# Patient Record
Sex: FEMALE | Race: WHITE | NOT HISPANIC OR LATINO | URBAN - METROPOLITAN AREA
[De-identification: names, ages, dates, MRNs, and addresses within clinical notes are randomized per-mention and may not be internally consistent; named-entity substitution may affect disease eponyms.]

---

## 2024-08-25 ENCOUNTER — APPOINTMENT (OUTPATIENT)
Dept: RADIOLOGY | Facility: MEDICAL CENTER | Age: 64
End: 2024-08-25
Attending: EMERGENCY MEDICINE

## 2024-08-25 ENCOUNTER — HOSPITAL ENCOUNTER (INPATIENT)
Facility: MEDICAL CENTER | Age: 64
LOS: 2 days | End: 2024-08-28
Attending: EMERGENCY MEDICINE | Admitting: SURGERY

## 2024-08-25 DIAGNOSIS — K85.10 GALLSTONE PANCREATITIS: ICD-10-CM

## 2024-08-25 LAB
ALBUMIN SERPL BCP-MCNC: 4.9 G/DL (ref 3.2–4.9)
ALBUMIN/GLOB SERPL: 1.5 G/DL
ALP SERPL-CCNC: 332 U/L (ref 30–99)
ALT SERPL-CCNC: 416 U/L (ref 2–50)
ANION GAP SERPL CALC-SCNC: 23 MMOL/L (ref 7–16)
AST SERPL-CCNC: 140 U/L (ref 12–45)
BASOPHILS # BLD AUTO: 0.2 % (ref 0–1.8)
BASOPHILS # BLD: 0.04 K/UL (ref 0–0.12)
BILIRUB SERPL-MCNC: 5.5 MG/DL (ref 0.1–1.5)
BUN SERPL-MCNC: 11 MG/DL (ref 8–22)
CALCIUM ALBUM COR SERPL-MCNC: 9.8 MG/DL (ref 8.5–10.5)
CALCIUM SERPL-MCNC: 10.5 MG/DL (ref 8.5–10.5)
CHLORIDE SERPL-SCNC: 96 MMOL/L (ref 96–112)
CO2 SERPL-SCNC: 20 MMOL/L (ref 20–33)
CREAT SERPL-MCNC: 0.68 MG/DL (ref 0.5–1.4)
EKG IMPRESSION: NORMAL
EOSINOPHIL # BLD AUTO: 0 K/UL (ref 0–0.51)
EOSINOPHIL NFR BLD: 0 % (ref 0–6.9)
ERYTHROCYTE [DISTWIDTH] IN BLOOD BY AUTOMATED COUNT: 41.7 FL (ref 35.9–50)
GFR SERPLBLD CREATININE-BSD FMLA CKD-EPI: 98 ML/MIN/1.73 M 2
GLOBULIN SER CALC-MCNC: 3.3 G/DL (ref 1.9–3.5)
GLUCOSE SERPL-MCNC: 179 MG/DL (ref 65–99)
HCT VFR BLD AUTO: 47.1 % (ref 37–47)
HGB BLD-MCNC: 15.9 G/DL (ref 12–16)
IMM GRANULOCYTES # BLD AUTO: 0.09 K/UL (ref 0–0.11)
IMM GRANULOCYTES NFR BLD AUTO: 0.4 % (ref 0–0.9)
LIPASE SERPL-CCNC: >3000 U/L (ref 11–82)
LYMPHOCYTES # BLD AUTO: 2.88 K/UL (ref 1–4.8)
LYMPHOCYTES NFR BLD: 13 % (ref 22–41)
MCH RBC QN AUTO: 29.5 PG (ref 27–33)
MCHC RBC AUTO-ENTMCNC: 33.8 G/DL (ref 32.2–35.5)
MCV RBC AUTO: 87.4 FL (ref 81.4–97.8)
MONOCYTES # BLD AUTO: 1.18 K/UL (ref 0–0.85)
MONOCYTES NFR BLD AUTO: 5.3 % (ref 0–13.4)
NEUTROPHILS # BLD AUTO: 17.94 K/UL (ref 1.82–7.42)
NEUTROPHILS NFR BLD: 81.1 % (ref 44–72)
NRBC # BLD AUTO: 0 K/UL
NRBC BLD-RTO: 0 /100 WBC (ref 0–0.2)
PLATELET # BLD AUTO: 402 K/UL (ref 164–446)
PMV BLD AUTO: 9.5 FL (ref 9–12.9)
POTASSIUM SERPL-SCNC: 3.3 MMOL/L (ref 3.6–5.5)
PROT SERPL-MCNC: 8.2 G/DL (ref 6–8.2)
RBC # BLD AUTO: 5.39 M/UL (ref 4.2–5.4)
SODIUM SERPL-SCNC: 139 MMOL/L (ref 135–145)
TROPONIN T SERPL-MCNC: 7 NG/L (ref 6–19)
WBC # BLD AUTO: 22.1 K/UL (ref 4.8–10.8)

## 2024-08-25 PROCEDURE — 99285 EMERGENCY DEPT VISIT HI MDM: CPT

## 2024-08-25 PROCEDURE — 83690 ASSAY OF LIPASE: CPT

## 2024-08-25 PROCEDURE — 36415 COLL VENOUS BLD VENIPUNCTURE: CPT

## 2024-08-25 PROCEDURE — 700105 HCHG RX REV CODE 258: Performed by: EMERGENCY MEDICINE

## 2024-08-25 PROCEDURE — 84100 ASSAY OF PHOSPHORUS: CPT

## 2024-08-25 PROCEDURE — 83735 ASSAY OF MAGNESIUM: CPT

## 2024-08-25 PROCEDURE — 96375 TX/PRO/DX INJ NEW DRUG ADDON: CPT

## 2024-08-25 PROCEDURE — 80053 COMPREHEN METABOLIC PANEL: CPT

## 2024-08-25 PROCEDURE — 84484 ASSAY OF TROPONIN QUANT: CPT

## 2024-08-25 PROCEDURE — 85025 COMPLETE CBC W/AUTO DIFF WBC: CPT

## 2024-08-25 PROCEDURE — 93005 ELECTROCARDIOGRAM TRACING: CPT

## 2024-08-25 PROCEDURE — 76705 ECHO EXAM OF ABDOMEN: CPT

## 2024-08-25 PROCEDURE — 93005 ELECTROCARDIOGRAM TRACING: CPT | Performed by: EMERGENCY MEDICINE

## 2024-08-25 PROCEDURE — 700111 HCHG RX REV CODE 636 W/ 250 OVERRIDE (IP): Mod: JZ | Performed by: EMERGENCY MEDICINE

## 2024-08-25 RX ORDER — DIPHENHYDRAMINE HYDROCHLORIDE 50 MG/ML
25 INJECTION INTRAMUSCULAR; INTRAVENOUS ONCE
Status: COMPLETED | OUTPATIENT
Start: 2024-08-25 | End: 2024-08-25

## 2024-08-25 RX ORDER — ASPIRIN 81 MG/1
81 TABLET ORAL
COMMUNITY

## 2024-08-25 RX ORDER — MORPHINE SULFATE 4 MG/ML
4 INJECTION INTRAVENOUS ONCE
Status: DISCONTINUED | OUTPATIENT
Start: 2024-08-25 | End: 2024-08-26

## 2024-08-25 RX ORDER — ATORVASTATIN CALCIUM 10 MG/1
10 TABLET, FILM COATED ORAL NIGHTLY
COMMUNITY

## 2024-08-25 RX ORDER — SODIUM CHLORIDE, SODIUM LACTATE, POTASSIUM CHLORIDE, CALCIUM CHLORIDE 600; 310; 30; 20 MG/100ML; MG/100ML; MG/100ML; MG/100ML
1000 INJECTION, SOLUTION INTRAVENOUS ONCE
Status: COMPLETED | OUTPATIENT
Start: 2024-08-25 | End: 2024-08-26

## 2024-08-25 RX ADMIN — DIPHENHYDRAMINE HYDROCHLORIDE 25 MG: 50 INJECTION, SOLUTION INTRAMUSCULAR; INTRAVENOUS at 22:55

## 2024-08-25 RX ADMIN — SODIUM CHLORIDE, POTASSIUM CHLORIDE, SODIUM LACTATE AND CALCIUM CHLORIDE 1000 ML: 600; 310; 30; 20 INJECTION, SOLUTION INTRAVENOUS at 22:54

## 2024-08-26 ENCOUNTER — APPOINTMENT (OUTPATIENT)
Dept: RADIOLOGY | Facility: MEDICAL CENTER | Age: 64
End: 2024-08-26
Attending: INTERNAL MEDICINE

## 2024-08-26 PROBLEM — K85.10 ACUTE GALLSTONE PANCREATITIS: Status: ACTIVE | Noted: 2024-08-26

## 2024-08-26 LAB
APPEARANCE UR: CLEAR
BACTERIA #/AREA URNS HPF: NEGATIVE /HPF
BILIRUB UR QL STRIP.AUTO: ABNORMAL
COLOR UR: ABNORMAL
EPI CELLS #/AREA URNS HPF: NEGATIVE /HPF
GLUCOSE BLD STRIP.AUTO-MCNC: 116 MG/DL (ref 65–99)
GLUCOSE BLD STRIP.AUTO-MCNC: 80 MG/DL (ref 65–99)
GLUCOSE BLD STRIP.AUTO-MCNC: 88 MG/DL (ref 65–99)
GLUCOSE BLD STRIP.AUTO-MCNC: 94 MG/DL (ref 65–99)
GLUCOSE UR STRIP.AUTO-MCNC: NEGATIVE MG/DL
HYALINE CASTS #/AREA URNS LPF: ABNORMAL /LPF
KETONES UR STRIP.AUTO-MCNC: 80 MG/DL
LEUKOCYTE ESTERASE UR QL STRIP.AUTO: NEGATIVE
MAGNESIUM SERPL-MCNC: 2 MG/DL (ref 1.5–2.5)
MICRO URNS: ABNORMAL
NITRITE UR QL STRIP.AUTO: NEGATIVE
PH UR STRIP.AUTO: 5.5 [PH] (ref 5–8)
PHOSPHATE SERPL-MCNC: 1.8 MG/DL (ref 2.5–4.5)
PROT UR QL STRIP: 30 MG/DL
RBC # URNS HPF: ABNORMAL /HPF
RBC UR QL AUTO: ABNORMAL
SP GR UR STRIP.AUTO: 1.01
UROBILINOGEN UR STRIP.AUTO-MCNC: 1 MG/DL
WBC #/AREA URNS HPF: ABNORMAL /HPF

## 2024-08-26 PROCEDURE — 700105 HCHG RX REV CODE 258: Performed by: EMERGENCY MEDICINE

## 2024-08-26 PROCEDURE — 99232 SBSQ HOSP IP/OBS MODERATE 35: CPT | Mod: DUPCHRG | Performed by: SURGERY

## 2024-08-26 PROCEDURE — 82962 GLUCOSE BLOOD TEST: CPT | Mod: 91

## 2024-08-26 PROCEDURE — 96366 THER/PROPH/DIAG IV INF ADDON: CPT

## 2024-08-26 PROCEDURE — 770001 HCHG ROOM/CARE - MED/SURG/GYN PRIV*

## 2024-08-26 PROCEDURE — A9270 NON-COVERED ITEM OR SERVICE: HCPCS | Performed by: SURGERY

## 2024-08-26 PROCEDURE — 700102 HCHG RX REV CODE 250 W/ 637 OVERRIDE(OP): Performed by: SURGERY

## 2024-08-26 PROCEDURE — 99222 1ST HOSP IP/OBS MODERATE 55: CPT | Performed by: INTERNAL MEDICINE

## 2024-08-26 PROCEDURE — 81001 URINALYSIS AUTO W/SCOPE: CPT

## 2024-08-26 PROCEDURE — 96375 TX/PRO/DX INJ NEW DRUG ADDON: CPT

## 2024-08-26 PROCEDURE — 700105 HCHG RX REV CODE 258: Performed by: SURGERY

## 2024-08-26 PROCEDURE — 96365 THER/PROPH/DIAG IV INF INIT: CPT

## 2024-08-26 PROCEDURE — 700111 HCHG RX REV CODE 636 W/ 250 OVERRIDE (IP): Mod: JZ | Performed by: EMERGENCY MEDICINE

## 2024-08-26 PROCEDURE — 96376 TX/PRO/DX INJ SAME DRUG ADON: CPT

## 2024-08-26 PROCEDURE — 700111 HCHG RX REV CODE 636 W/ 250 OVERRIDE (IP): Mod: JZ | Performed by: SURGERY

## 2024-08-26 PROCEDURE — 99222 1ST HOSP IP/OBS MODERATE 55: CPT | Performed by: SURGERY

## 2024-08-26 RX ORDER — AMOXICILLIN 250 MG
1 CAPSULE ORAL NIGHTLY
Status: DISCONTINUED | OUTPATIENT
Start: 2024-08-26 | End: 2024-08-28 | Stop reason: HOSPADM

## 2024-08-26 RX ORDER — ATORVASTATIN CALCIUM 10 MG/1
10 TABLET, FILM COATED ORAL NIGHTLY
Status: DISCONTINUED | OUTPATIENT
Start: 2024-08-26 | End: 2024-08-28 | Stop reason: HOSPADM

## 2024-08-26 RX ORDER — CELECOXIB 200 MG/1
200 CAPSULE ORAL 2 TIMES DAILY
Status: DISCONTINUED | OUTPATIENT
Start: 2024-08-26 | End: 2024-08-28 | Stop reason: HOSPADM

## 2024-08-26 RX ORDER — CELECOXIB 200 MG/1
200 CAPSULE ORAL 2 TIMES DAILY PRN
Status: DISCONTINUED | OUTPATIENT
Start: 2024-08-31 | End: 2024-08-28 | Stop reason: HOSPADM

## 2024-08-26 RX ORDER — OXYCODONE HYDROCHLORIDE 10 MG/1
10 TABLET ORAL
Status: DISCONTINUED | OUTPATIENT
Start: 2024-08-26 | End: 2024-08-28 | Stop reason: HOSPADM

## 2024-08-26 RX ORDER — DIPHENHYDRAMINE HYDROCHLORIDE 50 MG/ML
25 INJECTION INTRAMUSCULAR; INTRAVENOUS ONCE
Status: COMPLETED | OUTPATIENT
Start: 2024-08-26 | End: 2024-08-26

## 2024-08-26 RX ORDER — AMOXICILLIN 250 MG
1 CAPSULE ORAL
Status: DISCONTINUED | OUTPATIENT
Start: 2024-08-26 | End: 2024-08-28 | Stop reason: HOSPADM

## 2024-08-26 RX ORDER — ONDANSETRON 2 MG/ML
4 INJECTION INTRAMUSCULAR; INTRAVENOUS EVERY 4 HOURS PRN
Status: DISCONTINUED | OUTPATIENT
Start: 2024-08-26 | End: 2024-08-28 | Stop reason: HOSPADM

## 2024-08-26 RX ORDER — POLYETHYLENE GLYCOL 3350 17 G/17G
1 POWDER, FOR SOLUTION ORAL 2 TIMES DAILY
Status: DISCONTINUED | OUTPATIENT
Start: 2024-08-26 | End: 2024-08-28 | Stop reason: HOSPADM

## 2024-08-26 RX ORDER — OXYCODONE HYDROCHLORIDE 5 MG/1
5 TABLET ORAL
Status: DISCONTINUED | OUTPATIENT
Start: 2024-08-26 | End: 2024-08-28 | Stop reason: HOSPADM

## 2024-08-26 RX ORDER — ONDANSETRON 4 MG/1
4 TABLET, ORALLY DISINTEGRATING ORAL EVERY 4 HOURS PRN
Status: DISCONTINUED | OUTPATIENT
Start: 2024-08-26 | End: 2024-08-28 | Stop reason: HOSPADM

## 2024-08-26 RX ORDER — DOCUSATE SODIUM 100 MG/1
100 CAPSULE, LIQUID FILLED ORAL 2 TIMES DAILY
Status: DISCONTINUED | OUTPATIENT
Start: 2024-08-26 | End: 2024-08-28 | Stop reason: HOSPADM

## 2024-08-26 RX ORDER — SODIUM CHLORIDE, SODIUM LACTATE, POTASSIUM CHLORIDE, CALCIUM CHLORIDE 600; 310; 30; 20 MG/100ML; MG/100ML; MG/100ML; MG/100ML
INJECTION, SOLUTION INTRAVENOUS CONTINUOUS
Status: DISCONTINUED | OUTPATIENT
Start: 2024-08-26 | End: 2024-08-27

## 2024-08-26 RX ORDER — MORPHINE SULFATE 4 MG/ML
2 INJECTION INTRAVENOUS ONCE
Status: COMPLETED | OUTPATIENT
Start: 2024-08-26 | End: 2024-08-26

## 2024-08-26 RX ORDER — SODIUM PHOSPHATE,MONO-DIBASIC 19G-7G/118
1 ENEMA (ML) RECTAL
Status: DISCONTINUED | OUTPATIENT
Start: 2024-08-26 | End: 2024-08-28 | Stop reason: HOSPADM

## 2024-08-26 RX ORDER — ACETAMINOPHEN 500 MG
1000 TABLET ORAL EVERY 6 HOURS
Status: DISCONTINUED | OUTPATIENT
Start: 2024-08-26 | End: 2024-08-27

## 2024-08-26 RX ORDER — HYDROMORPHONE HYDROCHLORIDE 1 MG/ML
0.5 INJECTION, SOLUTION INTRAMUSCULAR; INTRAVENOUS; SUBCUTANEOUS
Status: DISCONTINUED | OUTPATIENT
Start: 2024-08-26 | End: 2024-08-28 | Stop reason: HOSPADM

## 2024-08-26 RX ORDER — ACETAMINOPHEN 500 MG
1000 TABLET ORAL EVERY 6 HOURS PRN
Status: DISCONTINUED | OUTPATIENT
Start: 2024-08-31 | End: 2024-08-27

## 2024-08-26 RX ORDER — BISACODYL 10 MG
10 SUPPOSITORY, RECTAL RECTAL
Status: DISCONTINUED | OUTPATIENT
Start: 2024-08-26 | End: 2024-08-28 | Stop reason: HOSPADM

## 2024-08-26 RX ORDER — FAMOTIDINE 20 MG/1
20 TABLET, FILM COATED ORAL 2 TIMES DAILY
Status: DISCONTINUED | OUTPATIENT
Start: 2024-08-26 | End: 2024-08-28 | Stop reason: HOSPADM

## 2024-08-26 RX ADMIN — MORPHINE SULFATE 2 MG: 4 INJECTION, SOLUTION INTRAMUSCULAR; INTRAVENOUS at 00:12

## 2024-08-26 RX ADMIN — PIPERACILLIN AND TAZOBACTAM 4.5 G: 4; .5 INJECTION, POWDER, FOR SOLUTION INTRAVENOUS at 15:24

## 2024-08-26 RX ADMIN — CELECOXIB 200 MG: 200 CAPSULE ORAL at 06:17

## 2024-08-26 RX ADMIN — CELECOXIB 200 MG: 200 CAPSULE ORAL at 16:28

## 2024-08-26 RX ADMIN — FAMOTIDINE 20 MG: 20 TABLET, FILM COATED ORAL at 06:17

## 2024-08-26 RX ADMIN — ACETAMINOPHEN 1000 MG: 500 TABLET ORAL at 01:40

## 2024-08-26 RX ADMIN — MAGNESIUM HYDROXIDE 30 ML: 1200 LIQUID ORAL at 01:42

## 2024-08-26 RX ADMIN — SODIUM CHLORIDE, POTASSIUM CHLORIDE, SODIUM LACTATE AND CALCIUM CHLORIDE: 600; 310; 30; 20 INJECTION, SOLUTION INTRAVENOUS at 00:45

## 2024-08-26 RX ADMIN — SODIUM CHLORIDE, POTASSIUM CHLORIDE, SODIUM LACTATE AND CALCIUM CHLORIDE: 600; 310; 30; 20 INJECTION, SOLUTION INTRAVENOUS at 13:17

## 2024-08-26 RX ADMIN — ACETAMINOPHEN 1000 MG: 500 TABLET ORAL at 06:17

## 2024-08-26 RX ADMIN — POLYETHYLENE GLYCOL 3350 1 PACKET: 17 POWDER, FOR SOLUTION ORAL at 06:17

## 2024-08-26 RX ADMIN — DOCUSATE SODIUM 100 MG: 100 CAPSULE, LIQUID FILLED ORAL at 06:16

## 2024-08-26 RX ADMIN — ATORVASTATIN CALCIUM 10 MG: 10 TABLET, FILM COATED ORAL at 20:51

## 2024-08-26 RX ADMIN — PIPERACILLIN AND TAZOBACTAM 4.5 G: 4; .5 INJECTION, POWDER, FOR SOLUTION INTRAVENOUS at 03:40

## 2024-08-26 RX ADMIN — PIPERACILLIN AND TAZOBACTAM 4.5 G: 4; .5 INJECTION, POWDER, FOR SOLUTION INTRAVENOUS at 20:53

## 2024-08-26 RX ADMIN — PIPERACILLIN AND TAZOBACTAM 3.38 G: 3; .375 INJECTION, POWDER, FOR SOLUTION INTRAVENOUS at 00:16

## 2024-08-26 RX ADMIN — FAMOTIDINE 20 MG: 20 TABLET, FILM COATED ORAL at 16:25

## 2024-08-26 RX ADMIN — DIPHENHYDRAMINE HYDROCHLORIDE 25 MG: 50 INJECTION, SOLUTION INTRAMUSCULAR; INTRAVENOUS at 00:11

## 2024-08-26 SDOH — ECONOMIC STABILITY: TRANSPORTATION INSECURITY
IN THE PAST 12 MONTHS, HAS THE LACK OF TRANSPORTATION KEPT YOU FROM MEDICAL APPOINTMENTS OR FROM GETTING MEDICATIONS?: NO

## 2024-08-26 SDOH — ECONOMIC STABILITY: TRANSPORTATION INSECURITY
IN THE PAST 12 MONTHS, HAS LACK OF RELIABLE TRANSPORTATION KEPT YOU FROM MEDICAL APPOINTMENTS, MEETINGS, WORK OR FROM GETTING THINGS NEEDED FOR DAILY LIVING?: NO

## 2024-08-26 ASSESSMENT — COGNITIVE AND FUNCTIONAL STATUS - GENERAL
DAILY ACTIVITIY SCORE: 24
MOBILITY SCORE: 24
SUGGESTED CMS G CODE MODIFIER MOBILITY: CH
SUGGESTED CMS G CODE MODIFIER DAILY ACTIVITY: CH

## 2024-08-26 ASSESSMENT — SOCIAL DETERMINANTS OF HEALTH (SDOH)
WITHIN THE LAST YEAR, HAVE YOU BEEN HUMILIATED OR EMOTIONALLY ABUSED IN OTHER WAYS BY YOUR PARTNER OR EX-PARTNER?: NO
WITHIN THE LAST YEAR, HAVE TO BEEN RAPED OR FORCED TO HAVE ANY KIND OF SEXUAL ACTIVITY BY YOUR PARTNER OR EX-PARTNER?: NO
WITHIN THE PAST 12 MONTHS, YOU WORRIED THAT YOUR FOOD WOULD RUN OUT BEFORE YOU GOT THE MONEY TO BUY MORE: NEVER TRUE
WITHIN THE LAST YEAR, HAVE YOU BEEN KICKED, HIT, SLAPPED, OR OTHERWISE PHYSICALLY HURT BY YOUR PARTNER OR EX-PARTNER?: NO
WITHIN THE PAST 12 MONTHS, THE FOOD YOU BOUGHT JUST DIDN'T LAST AND YOU DIDN'T HAVE MONEY TO GET MORE: NEVER TRUE
WITHIN THE LAST YEAR, HAVE YOU BEEN AFRAID OF YOUR PARTNER OR EX-PARTNER?: NO
IN THE PAST 12 MONTHS, HAS THE ELECTRIC, GAS, OIL, OR WATER COMPANY THREATENED TO SHUT OFF SERVICE IN YOUR HOME?: NO

## 2024-08-26 ASSESSMENT — LIFESTYLE VARIABLES
DOES PATIENT WANT TO STOP DRINKING: NO
HAVE PEOPLE ANNOYED YOU BY CRITICIZING YOUR DRINKING: NO
HAVE YOU EVER FELT YOU SHOULD CUT DOWN ON YOUR DRINKING: NO
CONSUMPTION TOTAL: NEGATIVE
HOW MANY TIMES IN THE PAST YEAR HAVE YOU HAD 5 OR MORE DRINKS IN A DAY: 0
ON A TYPICAL DAY WHEN YOU DRINK ALCOHOL HOW MANY DRINKS DO YOU HAVE: 0
ALCOHOL_USE: NO
TOTAL SCORE: 0
EVER FELT BAD OR GUILTY ABOUT YOUR DRINKING: NO
AVERAGE NUMBER OF DAYS PER WEEK YOU HAVE A DRINK CONTAINING ALCOHOL: 0
TOTAL SCORE: 0
EVER HAD A DRINK FIRST THING IN THE MORNING TO STEADY YOUR NERVES TO GET RID OF A HANGOVER: NO
TOTAL SCORE: 0

## 2024-08-26 ASSESSMENT — COPD QUESTIONNAIRES
COPD SCREENING SCORE: 2
DURING THE PAST 4 WEEKS HOW MUCH DID YOU FEEL SHORT OF BREATH: NONE/LITTLE OF THE TIME
DO YOU EVER COUGH UP ANY MUCUS OR PHLEGM?: NO/ONLY WITH OCCASIONAL COLDS OR INFECTIONS
HAVE YOU SMOKED AT LEAST 100 CIGARETTES IN YOUR ENTIRE LIFE: NO/DON'T KNOW

## 2024-08-26 ASSESSMENT — PATIENT HEALTH QUESTIONNAIRE - PHQ9
SUM OF ALL RESPONSES TO PHQ9 QUESTIONS 1 AND 2: 0
2. FEELING DOWN, DEPRESSED, IRRITABLE, OR HOPELESS: NOT AT ALL
2. FEELING DOWN, DEPRESSED, IRRITABLE, OR HOPELESS: NOT AT ALL
1. LITTLE INTEREST OR PLEASURE IN DOING THINGS: NOT AT ALL
SUM OF ALL RESPONSES TO PHQ9 QUESTIONS 1 AND 2: 0
1. LITTLE INTEREST OR PLEASURE IN DOING THINGS: NOT AT ALL

## 2024-08-26 ASSESSMENT — PAIN DESCRIPTION - PAIN TYPE
TYPE: ACUTE PAIN

## 2024-08-26 ASSESSMENT — FIBROSIS 4 INDEX
FIB4 SCORE: 1.06
FIB4 SCORE: 1.06

## 2024-08-26 NOTE — PROGRESS NOTES
Received report from previous shift RN  Assessment complete.  A&O x 4. Patient calls appropriately.  Patient ambulates with one person assist.  Patient has 0/10 pain. Patient declined interventions  Denies N&V. Patient is NPO   + void, + flatus, + BM.  Patient denies SOB.  Patient sitting up in bed. Family at bedside   Review plan with of care with patient. Call light and personal belongings with in reach. Hourly rounding in place. All needs met at this time.

## 2024-08-26 NOTE — ED NOTES
Patient remains pain free.  Awaiting to go to MRI.  Patients bed in [facility] ready and clean, transport here, report called.  Patient transported to T432, all belongings up with patient.

## 2024-08-26 NOTE — H&P
"    CHIEF COMPLAINT: Right upper quadrant pain     HISTORY OF PRESENT ILLNESS: The patient is a 63 year-old [place] woman who presents to the Emergency Department a 4-day history of progressive epigastric and right subcostal abdominal pain. The pain is associated with nausea and vomiting.  In retrospect, she believes that she has had several prior episodes of less severe biliary colic.  She denies any food intolerance or temporal relationship between symptoms and eating.  She denies a family history of gallstones. The patient denies any recent or intercurrent illness. The patient denies any history of previous abdominal surgery.    PAST MEDICAL HISTORY:  has a past medical history of CVA (cerebral vascular accident) (HCC) (01/01/2012) and HLD (hyperlipidemia).    PAST SURGICAL HISTORY:  has no past surgical history on file.    ALLERGIES: Not on File    CURRENT MEDICATIONS: Atorvastatin and aspirin.    FAMILY HISTORY: family history is not on file.    SOCIAL HISTORY:  The patient is currently visiting the [facility] from [place].  She was scheduled to leave for home tomorrow.    REVIEW OF SYSTEMS: Comprehensive review of systems is negative with the exception of the aforementioned HPI, PMH, and PSH bullets in accordance with CMS guidelines.    PHYSICAL EXAMINATION:      Constitutional:     Vital Signs: BP (!) 169/81   Pulse 76   Temp 35.9 °C (96.7 °F) (Temporal)   Resp 19   Ht 1.62 m (5' 3.78\")   Wt 70.3 kg (155 lb)   SpO2 95%    General Appearance: is in no apparent distress, uncomfortable appearing.  HEENT: The pupils are equal, round, and reactive to light bilaterally. The extraocular muscles are intact bilaterally. The sclera are icteric. Nares and oropharynx are clear.   Neck: Supple. No adenopathy.  Respiratory:   Inspection: Unlabored respirations, no intercostal retractions, paradoxical motion, or accessory muscle use.   Auscultation: clear to auscultation.  Cardiovascular:   Inspection: The skin " is warm, dry, and well purfused.  Auscultation: Regular rate and rhythm.   Peripheral Pulses: Normal.   Abdomen:  Inspection: Abdominal inspection reveals  no abdominal distension.   Palpation: Palpation is remarkable for moderate tenderness in the epigastric and right subcostal region.   Extremities:   Examination of the upper and lower extremities demonstrates no cyanosis edema or clubbing.  Neurologic:   Alert & oriented to person, time and place. Normal motor function. Normal sensory function. No focal deficits noted.    LABORATORY VALUES:   Recent Labs     08/25/24 2236   WBC 22.1*   RBC 5.39   HEMOGLOBIN 15.9   HEMATOCRIT 47.1*   MCV 87.4   MCH 29.5   MCHC 33.8   RDW 41.7   PLATELETCT 402   MPV 9.5     Recent Labs     08/25/24 2236   SODIUM 139   POTASSIUM 3.3*   CHLORIDE 96   CO2 20   GLUCOSE 179*   BUN 11   CREATININE 0.68   CALCIUM 10.5     Recent Labs     08/25/24 2236   ASTSGOT 140*   ALTSGPT 416*   TBILIRUBIN 5.5*   ALKPHOSPHAT 332*   GLOBULIN 3.3     IMAGING:   US-RUQ   Final Result         1.  Cholelithiasis and gallbladder sludge with gallbladder wall thickening, sonographically compatible with cholecystitis.        ASSESSMENT AND PLAN:   The patient has Grade I (mild) acute cholecystitis, choledocholithiasis, and gallstone pancreatitis.     The patient will be admitted to the hospital, placed on bowel rest, and broad-spectrum antimicrobial therapy.  Gastroenterology evaluation for consideration of ERCP.  Short interval laparoscopic cholecystectomy on this hospital admission following clearance of biliary tract and resolution of acute pancreatitis or shortly after arrival to her home in [facility].    DISPOSITION: Admit [facility].       ____________________________________     Kin Boogie M.D.    DD: 8/26/2024  12:19 AM

## 2024-08-26 NOTE — ED NOTES
Break RN: patient medicated for pain. Antibiotic stared. Patient updated for plan of care by FRANKLYN.     Kermit ( Son ) [phone] would love an update tomorrow if possible.

## 2024-08-26 NOTE — CONSULTS
Date of Consultation:  8/26/2024    Patient: : Norma Irizarry  MRN: [id number]    Referring Physician: Dr. Erin Kellogg     GI:Audie Calix M.D.     Reason for Consultation: Gallstone pancreatitis    History of Present Illness: 63-year-old woman with history of CVA, hypertension, hyperlipidemia presents with 4-day history of upper abdominal pain which radiates into her back.  Associated with episodes of nausea and vomiting.  Prior to this she has had recurrent episodes of right upper quadrant pain suggestive of biliary colic.  No significant alcohol use.  No pancreatic issues in the past.  Upon admission she was noted to have a lipase of greater than 3000, white count of 22,000, elevated glucose, ALT /140 with a total bilirubin of 5.5.  Ultrasound showed multiple gallstones with gallbladder wall thickening compatible with cholecystitis.  Common bile duct was measured at only 4.5 mm i.e. nondilated.    Patient denies any GI bleeding, fevers, chills.      Patient has no allergy information on record.       Past Medical History:   Diagnosis Date    CVA (cerebral vascular accident) (HCC) 01/01/2012    HLD (hyperlipidemia)          No past surgical history on file.      No family history on file.      Social History     Socioeconomic History    Marital status:          ROS  Review of systems negative for 12 systems reviewed other than above-mentioned symptoms.      Physical Exam:  Vitals:    08/26/24 0442 08/26/24 0512 08/26/24 0612 08/26/24 0733   BP: (!) 156/72 (!) 140/67 (!) 141/66 130/71   Pulse: 81 79 93 74   Resp:   16 20   Temp:       TempSrc:       SpO2: 94% 91% 90% 96%   Weight:       Height:           Physical Exam  General appearance: No apparent distress.  HEENT: Eyes mild icterus, mucosa dry.  CVS: S1-S2 normal, RRR.  RS: Good air entry bilaterally.  Abdomen: Soft, epigastric and upper abdominal tenderness, distention.  Mild guarding.  CNS: A&O x3, nonfocal  exam.  Extremities: No edema.  Rectal: Deferred.      Labs:  Recent Labs     08/25/24 2236   WBC 22.1*   RBC 5.39   HEMOGLOBIN 15.9   HEMATOCRIT 47.1*   MCV 87.4   MCH 29.5   MCHC 33.8   RDW 41.7   PLATELETCT 402   MPV 9.5     Recent Labs     08/25/24 2236   SODIUM 139   POTASSIUM 3.3*   CHLORIDE 96   CO2 20   GLUCOSE 179*   BUN 11           Recent Labs     08/25/24 2236   ASTSGOT 140*   ALTSGPT 416*   TBILIRUBIN 5.5*   ALKPHOSPHAT 332*   GLOBULIN 3.3         Imaging:  US-RUQ  Narrative:   8/25/2024 10:48 PM    HISTORY/REASON FOR EXAM:  Abdominal pain    TECHNIQUE/EXAM DESCRIPTION AND NUMBER OF VIEWS:  Real-time sonography of the liver and biliary tree.    COMPARISON: None    FINDINGS:    The liver is normal in contour. There is no evidence of solid mass lesion. The liver measures 12.68 cm.    Gallbladder sludge is seen. Shadowing gallstones are seen.  The gallbladder wall thickness measures 3.50 mm. There is no pericholecystic fluid.    The common duct measures 4.50 mm.    The visualized pancreas is unremarkable.    The visualized aorta is normal in caliber.    Intrahepatic IVC is patent.    The portal vein is patent with hepatopetal flow. The MPV measures 1.15 cm cm.    The right kidney measures 8.65 cm, suboptimally visualized due to shadowing bowel gas.    There is no ascites.  Impression: 1.  Cholelithiasis and gallbladder sludge with gallbladder wall thickening, sonographically compatible with cholecystitis.            Impressions:  1.  Gallstone pancreatitis.  2.  Acute cholecystitis.  3.  Elevated LFTs.      Recommendations:  1.  Aggressive fluid hydration.  Patient appears to be dehydrated and hemoconcentrated.  Would recommend lactated Ringer's at 200 cc an hour for 24 hours.  2.  Trend LFTs.  LFTs could be elevated because of choledocholithiasis however a nondilated bile duct suggests against this diagnosis.  Other possibilities could include pancreatic edema causing compression of the distal common  bile duct versus the acute cholecystitis itself.  I would recommend an MRCP stat to assess the bile ducts.  If there is choledocholithiasis we can proceed with ERCP in the next day or 2.  3.  Patient has been seen by general surgery and will likely get cholecystectomy this hospitalization after bile duct clearance.  4.  Trend BUN, hematocrit to assess for adequate hemodilution and fluid resuscitation status.      This note was generated using voice recognition software which has a small chance of producing errors of grammar and possibly content. I have made every reasonable attempt to find and correct any obvious errors, but expect that some may not be found prior to finalization of this note.

## 2024-08-26 NOTE — ED TRIAGE NOTES
"Chief Complaint   Patient presents with    Abdominal Pain     Pt endorses pressure midline abdominal pressure x4 days, increasing today.  Vomiting x2 hours. -fever - diarrhea.   + nasuea         Pt is GCS 15, speaking in full sentences, follows commands and responds appropriately to questions. Resp are even and unlabored.     BP (!) 88/32   Pulse (!) 58   Temp 35.9 °C (96.7 °F) (Temporal)   Resp 20   Ht 1.62 m (5' 3.78\")   Wt 70.3 kg (155 lb)   SpO2 93%   BMI 26.79 kg/m²         "

## 2024-08-26 NOTE — ED NOTES
Med Rec complete per patient   Allergies reviewed-yes  Antibiotics in the past 30 days:no  Anticoagulant in past 14 days:no  Pharmacy patient utilizes:[facility]

## 2024-08-26 NOTE — ED NOTES
Patient denies pain at this time.  Awaiting to go to MRI.  Floor bed ordered for patient for her comfort.

## 2024-08-26 NOTE — PROGRESS NOTES
"  DATE: 8/26/2024    Hospital Day2  gallstone pancreatitis .    INTERVAL EVENTS:  Feeling much better this morning. No N/V. Pain much improved. .    REVIEW OF SYSTEMS:  Review of systems is remarkable for the following mild abdominal pain. The remainder of the comprehensive ROS is negative with the exception of the aforementioned HPI, PMH, and PSH bullets in accordance with CMS guideline.    PHYSICAL EXAMINATION:  Vital Signs: BP (!) 141/69   Pulse 78   Temp 36.9 °C (98.4 °F) (Temporal)   Resp 20   Ht 1.62 m (5' 3.78\")   Wt 70.3 kg (155 lb)   SpO2 95%   Physical Exam  Constitutional:       Appearance: Normal appearance.   HENT:      Head: Normocephalic and atraumatic.      Nose: Nose normal.      Mouth/Throat:      Mouth: Mucous membranes are moist.   Eyes:      Extraocular Movements: Extraocular movements intact.   Cardiovascular:      Rate and Rhythm: Normal rate and regular rhythm.   Pulmonary:      Effort: Pulmonary effort is normal.   Skin:     General: Skin is warm and dry.      Capillary Refill: Capillary refill takes less than 2 seconds.      Coloration: Skin is not jaundiced.   Neurological:      General: No focal deficit present.      Mental Status: She is alert.   Psychiatric:         Mood and Affect: Mood normal.         Behavior: Behavior normal.         LABORATORY VALUES:  Recent Labs     08/25/24 2236   WBC 22.1*   RBC 5.39   HEMOGLOBIN 15.9   HEMATOCRIT 47.1*   MCV 87.4   MCH 29.5   MCHC 33.8   RDW 41.7   PLATELETCT 402   MPV 9.5     Recent Labs     08/25/24  2236   SODIUM 139   POTASSIUM 3.3*   CHLORIDE 96   CO2 20   GLUCOSE 179*   BUN 11   CREATININE 0.68   CALCIUM 10.5     Recent Labs     08/25/24  2236   ASTSGOT 140*   ALTSGPT 416*   TBILIRUBIN 5.5*   ALKPHOSPHAT 332*   GLOBULIN 3.3           IMAGING:  US-RUQ   Final Result         1.  Cholelithiasis and gallbladder sludge with gallbladder wall thickening, sonographically compatible with cholecystitis.      [id number]    (Results " Pending)     On my review of the images there is cholelithiasis and minimal gallbladder wall thickening. Common bile duct is normal in size.     ASSESSMENT AND PLAN:  63 year old woman with gallstone pancreatitis.   GI Consult note pending.   MRCP pending.   Patient feeling much better.   She expresses that she would like to have her cholecystectomy when she gets back home to [place].   AM CBC/CMP.   Appreciate GI consult.            ____________________________________     Leia Uribe M.D.    DD: 8/26/2024  9:56 AM

## 2024-08-26 NOTE — ED NOTES
Bedside report received, assuming care.  Patient resting on gurney, side rails up, call light in reach.  Patient reports that she is comfortable at this time.  Awaiting an admit bed assignment, patient updated. States no needs at this time.  Pillow provided.

## 2024-08-26 NOTE — ED PROVIDER NOTES
"ED Provider Note    CHIEF COMPLAINT  Chief Complaint   Patient presents with    Abdominal Pain     Pt endorses pressure midline abdominal pressure x4 days, increasing today.  Vomiting x2 hours.  + dizziness. -fever - diarrhea.   + nasuea       HPI/ROS  LIMITATION TO HISTORY   Select: : None  OUTSIDE HISTORIAN(S):  oscar Van Myke Irizarry is a 63 y.o. female who presents to the emergency department chief complaint of abdominal pain nausea vomiting.  She has had some right upper quadrant midline abdominal discomfort for the last 4 days and over the last day has had progressively worsened.  Is gotten the point she has had nonstop vomiting 2 hours prior to arrival with the pain which significantly worsened.  Some nausea she has not constipation but no diarrhea some chills no fevers.  No chest pain or shortness of breath no dyspnea on exertion.  No unilateral bilateral leg swelling.  The pain does not radiate to her back just mostly in the mid abdomen.    PAST MEDICAL HISTORY   has a past medical history of CVA (cerebral vascular accident) (HCC) (01/01/2012) and HLD (hyperlipidemia).    SURGICAL HISTORY  patient denies any surgical history    FAMILY HISTORY  No family history on file.    SOCIAL HISTORY  Social History     Tobacco Use    Smoking status: Not on file    Smokeless tobacco: Not on file   Substance and Sexual Activity    Alcohol use: Not on file    Drug use: Not on file    Sexual activity: Not on file       CURRENT MEDICATIONS  Home Medications    **Home medications have not yet been reviewed for this encounter**         ALLERGIES  Not on File    PHYSICAL EXAM  VITAL SIGNS: BP (!) 88/32   Pulse (!) 58   Temp 35.9 °C (96.7 °F) (Temporal)   Resp 20   Ht 1.62 m (5' 3.78\")   Wt 70.3 kg (155 lb)   SpO2 93%   BMI 26.79 kg/m²    Pulse OX: Pulse Oxygen level is within normal limits  Constitutional: Alert in no apparent distress.  HENT: Normocephalic, Atraumatic, dry mucous membranes  Eyes: " PERound. Conjunctiva normal, non-icteric.   Heart: Regular rate and rhythm, intact distal pulses   Lungs: Symmetrical movement, no resp distress   Abdomen: Right upper quadrant tenderness to palpation with epigastric tenderness as well localized guarding no rebound non-distended, normal bowel sounds  EXT/Back no edema  Skin: Warm, Dry, No erythema, No rash.   Neurologic: Alert and oriented, Grossly non-focal.       EKG/LABS  Labs Reviewed   CBC WITH DIFFERENTIAL - Abnormal; Notable for the following components:       Result Value    WBC 22.1 (*)     Hematocrit 47.1 (*)     Neutrophils-Polys 81.10 (*)     Lymphocytes 13.00 (*)     Neutrophils (Absolute) 17.94 (*)     Monos (Absolute) 1.18 (*)     All other components within normal limits   COMP METABOLIC PANEL - Abnormal; Notable for the following components:    Potassium 3.3 (*)     Anion Gap 23.0 (*)     Glucose 179 (*)     AST(SGOT) 140 (*)     ALT(SGPT) 416 (*)     Alkaline Phosphatase 332 (*)     Total Bilirubin 5.5 (*)     All other components within normal limits   LIPASE - Abnormal; Notable for the following components:    Lipase >3000 (*)     All other components within normal limits   URINALYSIS - Abnormal; Notable for the following components:    Ketones 80 (*)     Protein 30 (*)     Bilirubin Small (*)     Occult Blood Moderate (*)     All other components within normal limits   URINE MICROSCOPIC (W/UA) - Abnormal; Notable for the following components:    RBC 10-20 (*)     All other components within normal limits   TROPONIN   ESTIMATED GFR   MAGNESIUM   PHOSPHORUS   POCT GLUCOSE   POCT GLUCOSE   POCT GLUCOSE   POCT GLUCOSE         Results for orders placed or performed during the hospital encounter of 08/25/24   EKG   Result Value Ref Range    Report       [facility] Emergency Dept.    Test Date:  2024-08-25  Pt Name:    BEENA PABLO               Department: ER  MRN:        [id number]                      Room:  Gender:     Female                        Technician: [id number]  :        1960                   Requested By:ER TRIAGE PROTOCOL  Order #:    [id number]                    Reading MD: Erin Kellogg MD    Measurements  Intervals                                Axis  Rate:       60                           P:          83  IL:         141                          QRS:        32  QRSD:       93                           T:          -28  QT:         549  QTc:        549    Interpretive Statements  Sinus rhythm at a rate of 60 no ST elevations or ST depressions or QT  prolongation otherwise normal intervals normal axis no abnormal Q waves or T  wave inversions  No previous ECG available for comparison  Electronically Signed On 2024 22:34:35 PDT by Erin Kellogg MD         I have independently interpreted this EKG    RADIOLOGY/PROCEDURES   I have independently interpreted the diagnostic imaging associated with this visit and am waiting the final reading from the radiologist.   My preliminary interpretation is as follows:     Right upper quadrant ultrasound cholelithiasis with gallbladder thickening    Radiologist interpretation:  US-RUQ   Final Result         1.  Cholelithiasis and gallbladder sludge with gallbladder wall thickening, sonographically compatible with cholecystitis.          COURSE & MEDICAL DECISION MAKING    ASSESSMENT, COURSE AND PLAN  Care Narrative:     Patient is a 63-year-old female presents emerged permit with 4 days of worsening abdominal pain with epigastric and right upper quadrant pain on my examination, she has had this progressive vomiting as well.  EKG which is done shows a prolonged QTc which is why she be treated with Benadryl for her nausea vomiting as well as morphine.   states she does have a history of gallstones but is never had any issues of concern for cholelithiasis choledocholithiasis gallstone pancreatitis.  She be treated with IV fluids although she was hypotensive on arrival her  bedside blood pressure in the room is significantly improved.  An ultrasound does not show any active disease in the gallbladder will move onto CT abdomen pelvis.    DISPOSITION AND DISCUSSIONS    11:36 PM  I reassessed patient at the bedside they are doing the ultrasound as we speak there is definitely evidence of cholelithiasis her laboratory and Alysis consistent with an obstructive process likely gallstone pancreatitis and a elevated LFTs and elevated T. bili.  She will be started on IV Zosyn for concern for infectious process such as a cholecystitis.    12:23 AM  Spoke dante Boogie on-call for general surgery based on evidence of cholecystitis on ultrasound elevated blood cell count or gallstone pancreatitis.  He will take the patient to her service but is requesting a consult gastroenterology.      1:03 AM  Spoke w DR Calix with GI and he will evaluate the patient in the morning for possible ERCP  Hydration: Based on the patient's presentation of Acute Vomiting and Dehydration the patient was given IV fluids. IV Hydration was used because oral hydration was not adequate alone. Upon recheck following hydration, the patient was improved.        I have discussed management of the patient with the following physicians and EMERALD's:  Fifi Boogie    Discussion of management with other [facility] or appropriate source(s): None       FINAL DIAGNOSIS  1. Gallstone pancreatitis    2. Cholecystitis        Electronically signed by: Erin Kellogg M.D., 8/25/2024 10:38 PM

## 2024-08-27 ENCOUNTER — APPOINTMENT (OUTPATIENT)
Dept: RADIOLOGY | Facility: MEDICAL CENTER | Age: 64
End: 2024-08-27
Attending: INTERNAL MEDICINE
Payer: OTHER MISCELLANEOUS

## 2024-08-27 PROBLEM — Z78.9 NO CONTRAINDICATION TO DEEP VEIN THROMBOSIS (DVT) PROPHYLAXIS: Status: ACTIVE | Noted: 2024-08-27

## 2024-08-27 LAB
ALBUMIN SERPL BCP-MCNC: 3.6 G/DL (ref 3.2–4.9)
ALBUMIN/GLOB SERPL: 1.4 G/DL
ALP SERPL-CCNC: 227 U/L (ref 30–99)
ALT SERPL-CCNC: 217 U/L (ref 2–50)
ANION GAP SERPL CALC-SCNC: 15 MMOL/L (ref 7–16)
AST SERPL-CCNC: 66 U/L (ref 12–45)
BASOPHILS # BLD AUTO: 0.3 % (ref 0–1.8)
BASOPHILS # BLD: 0.05 K/UL (ref 0–0.12)
BILIRUB SERPL-MCNC: 1.9 MG/DL (ref 0.1–1.5)
BUN SERPL-MCNC: 13 MG/DL (ref 8–22)
CALCIUM ALBUM COR SERPL-MCNC: 8.8 MG/DL (ref 8.5–10.5)
CALCIUM SERPL-MCNC: 8.5 MG/DL (ref 8.5–10.5)
CHLORIDE SERPL-SCNC: 102 MMOL/L (ref 96–112)
CO2 SERPL-SCNC: 19 MMOL/L (ref 20–33)
CREAT SERPL-MCNC: 0.52 MG/DL (ref 0.5–1.4)
EOSINOPHIL # BLD AUTO: 0.11 K/UL (ref 0–0.51)
EOSINOPHIL NFR BLD: 0.6 % (ref 0–6.9)
ERYTHROCYTE [DISTWIDTH] IN BLOOD BY AUTOMATED COUNT: 43.5 FL (ref 35.9–50)
GFR SERPLBLD CREATININE-BSD FMLA CKD-EPI: 104 ML/MIN/1.73 M 2
GLOBULIN SER CALC-MCNC: 2.5 G/DL (ref 1.9–3.5)
GLUCOSE SERPL-MCNC: 68 MG/DL (ref 65–99)
HCT VFR BLD AUTO: 40.3 % (ref 37–47)
HGB BLD-MCNC: 13.7 G/DL (ref 12–16)
IMM GRANULOCYTES # BLD AUTO: 0.08 K/UL (ref 0–0.11)
IMM GRANULOCYTES NFR BLD AUTO: 0.4 % (ref 0–0.9)
LIPASE SERPL-CCNC: 1573 U/L (ref 11–82)
LYMPHOCYTES # BLD AUTO: 1.17 K/UL (ref 1–4.8)
LYMPHOCYTES NFR BLD: 6.5 % (ref 22–41)
MCH RBC QN AUTO: 29.9 PG (ref 27–33)
MCHC RBC AUTO-ENTMCNC: 34 G/DL (ref 32.2–35.5)
MCV RBC AUTO: 88 FL (ref 81.4–97.8)
MONOCYTES # BLD AUTO: 1.22 K/UL (ref 0–0.85)
MONOCYTES NFR BLD AUTO: 6.7 % (ref 0–13.4)
NEUTROPHILS # BLD AUTO: 15.45 K/UL (ref 1.82–7.42)
NEUTROPHILS NFR BLD: 85.5 % (ref 44–72)
NRBC # BLD AUTO: 0 K/UL
NRBC BLD-RTO: 0 /100 WBC (ref 0–0.2)
PLATELET # BLD AUTO: 247 K/UL (ref 164–446)
PMV BLD AUTO: 9.5 FL (ref 9–12.9)
POTASSIUM SERPL-SCNC: 3.1 MMOL/L (ref 3.6–5.5)
PROT SERPL-MCNC: 6.1 G/DL (ref 6–8.2)
RBC # BLD AUTO: 4.58 M/UL (ref 4.2–5.4)
SODIUM SERPL-SCNC: 136 MMOL/L (ref 135–145)
WBC # BLD AUTO: 18.1 K/UL (ref 4.8–10.8)

## 2024-08-27 PROCEDURE — A9270 NON-COVERED ITEM OR SERVICE: HCPCS | Mod: JZ | Performed by: NURSE PRACTITIONER

## 2024-08-27 PROCEDURE — 700102 HCHG RX REV CODE 250 W/ 637 OVERRIDE(OP): Performed by: SURGERY

## 2024-08-27 PROCEDURE — 99233 SBSQ HOSP IP/OBS HIGH 50: CPT | Mod: DUPCHRG | Performed by: SURGERY

## 2024-08-27 PROCEDURE — 770001 HCHG ROOM/CARE - MED/SURG/GYN PRIV*

## 2024-08-27 PROCEDURE — 99232 SBSQ HOSP IP/OBS MODERATE 35: CPT | Performed by: NURSE PRACTITIONER

## 2024-08-27 PROCEDURE — 83690 ASSAY OF LIPASE: CPT

## 2024-08-27 PROCEDURE — 700105 HCHG RX REV CODE 258: Performed by: SURGERY

## 2024-08-27 PROCEDURE — 700111 HCHG RX REV CODE 636 W/ 250 OVERRIDE (IP): Mod: JZ | Performed by: SURGERY

## 2024-08-27 PROCEDURE — 700102 HCHG RX REV CODE 250 W/ 637 OVERRIDE(OP): Mod: JZ | Performed by: NURSE PRACTITIONER

## 2024-08-27 PROCEDURE — 85025 COMPLETE CBC W/AUTO DIFF WBC: CPT

## 2024-08-27 PROCEDURE — 80053 COMPREHEN METABOLIC PANEL: CPT

## 2024-08-27 PROCEDURE — 700111 HCHG RX REV CODE 636 W/ 250 OVERRIDE (IP): Mod: JZ | Performed by: NURSE PRACTITIONER

## 2024-08-27 PROCEDURE — A9270 NON-COVERED ITEM OR SERVICE: HCPCS | Performed by: SURGERY

## 2024-08-27 PROCEDURE — 74181 MRI ABDOMEN W/O CONTRAST: CPT

## 2024-08-27 RX ORDER — POTASSIUM CHLORIDE 1500 MG/1
40 TABLET, EXTENDED RELEASE ORAL EVERY 6 HOURS
Status: COMPLETED | OUTPATIENT
Start: 2024-08-27 | End: 2024-08-27

## 2024-08-27 RX ORDER — ACETAMINOPHEN 500 MG
1000 TABLET ORAL EVERY 6 HOURS PRN
Status: DISCONTINUED | OUTPATIENT
Start: 2024-08-30 | End: 2024-08-27

## 2024-08-27 RX ORDER — ENOXAPARIN SODIUM 100 MG/ML
40 INJECTION SUBCUTANEOUS DAILY
Status: DISCONTINUED | OUTPATIENT
Start: 2024-08-27 | End: 2024-08-28 | Stop reason: HOSPADM

## 2024-08-27 RX ORDER — ACETAMINOPHEN 500 MG
1000 TABLET ORAL EVERY 6 HOURS PRN
Status: DISCONTINUED | OUTPATIENT
Start: 2024-08-27 | End: 2024-08-28 | Stop reason: HOSPADM

## 2024-08-27 RX ORDER — ACETAMINOPHEN 500 MG
1000 TABLET ORAL EVERY 6 HOURS PRN
Status: DISCONTINUED | OUTPATIENT
Start: 2024-08-27 | End: 2024-08-27

## 2024-08-27 RX ADMIN — FAMOTIDINE 20 MG: 20 TABLET, FILM COATED ORAL at 17:08

## 2024-08-27 RX ADMIN — POTASSIUM CHLORIDE 40 MEQ: 1500 TABLET, EXTENDED RELEASE ORAL at 14:00

## 2024-08-27 RX ADMIN — POTASSIUM CHLORIDE 40 MEQ: 1500 TABLET, EXTENDED RELEASE ORAL at 17:08

## 2024-08-27 RX ADMIN — ACETAMINOPHEN 1000 MG: 500 TABLET ORAL at 01:51

## 2024-08-27 RX ADMIN — PIPERACILLIN AND TAZOBACTAM 4.5 G: 4; .5 INJECTION, POWDER, FOR SOLUTION INTRAVENOUS at 11:35

## 2024-08-27 RX ADMIN — ENOXAPARIN SODIUM 40 MG: 100 INJECTION SUBCUTANEOUS at 17:09

## 2024-08-27 RX ADMIN — FAMOTIDINE 20 MG: 10 INJECTION, SOLUTION INTRAVENOUS at 06:00

## 2024-08-27 RX ADMIN — ATORVASTATIN CALCIUM 10 MG: 10 TABLET, FILM COATED ORAL at 21:25

## 2024-08-27 RX ADMIN — DOCUSATE SODIUM 100 MG: 100 CAPSULE, LIQUID FILLED ORAL at 17:08

## 2024-08-27 RX ADMIN — PIPERACILLIN AND TAZOBACTAM 4.5 G: 4; .5 INJECTION, POWDER, FOR SOLUTION INTRAVENOUS at 04:58

## 2024-08-27 RX ADMIN — ACETAMINOPHEN 1000 MG: 500 TABLET ORAL at 11:35

## 2024-08-27 RX ADMIN — CELECOXIB 200 MG: 200 CAPSULE ORAL at 17:08

## 2024-08-27 ASSESSMENT — ENCOUNTER SYMPTOMS
COUGH: 0
BACK PAIN: 0
DIARRHEA: 0
CONSTIPATION: 0
ABDOMINAL PAIN: 0
SHORTNESS OF BREATH: 0
VOMITING: 0
WEAKNESS: 0
HEARTBURN: 0
DIZZINESS: 0
BLURRED VISION: 0
FEVER: 0
NAUSEA: 0
DEPRESSION: 0
CHILLS: 0
BLOOD IN STOOL: 0

## 2024-08-27 ASSESSMENT — PAIN DESCRIPTION - PAIN TYPE
TYPE: ACUTE PAIN

## 2024-08-27 NOTE — CARE PLAN
The patient is Stable - Low risk of patient condition declining or worsening    Shift Goals  Clinical Goals: MRI, comfort, safety  Patient Goals: MRI, POC updates  Family Goals: monika    Progress made toward(s) clinical / shift goals:  Patient taken down for MRI, updated about POC. Patient tolerated ambulation. Upgraded to clear liquid diet.

## 2024-08-27 NOTE — PROGRESS NOTES
"  DATE: 8/27/2024    Hospital Day2 Gallstone pancreatitis     INTERVAL EVENTS:  MRCP this am- No indication for ERCP  K 3.1- Replace  Recheck lipase in am  Start Lovenox for DVT prophylaxis  Change Tylenol to PRN  Ok for clear liquid diet    Patient remains on IV Zosyn   Patient wants to wait to have surgery when she is back in [place]   REVIEW OF SYSTEMS:  Review of systems is remarkable for the following no pain. The remainder of the comprehensive ROS is negative with the exception of the aforementioned HPI, PMH, and PSH bullets in accordance with CMS guideline.    PHYSICAL EXAMINATION:  Vital Signs: BP (!) 141/81   Pulse 95   Temp 36.5 °C (97.7 °F) (Temporal)   Resp 16   Ht 1.62 m (5' 3.78\")   Wt 62.7 kg (138 lb 3.7 oz)   SpO2 97%   Physical Exam  Constitutional:       Appearance: Normal appearance.   HENT:      Head: Normocephalic.      Nose: Nose normal.      Mouth/Throat:      Mouth: Mucous membranes are moist.   Eyes:      Extraocular Movements: Extraocular movements intact.   Pulmonary:      Effort: Pulmonary effort is normal.   Abdominal:      Palpations: Abdomen is soft.   Skin:     General: Skin is warm and dry.      Capillary Refill: Capillary refill takes less than 2 seconds.      Coloration: Skin is not jaundiced.   Neurological:      General: No focal deficit present.      Mental Status: She is alert.   Psychiatric:         Mood and Affect: Mood normal.         Behavior: Behavior normal.         LABORATORY VALUES:  Recent Labs     08/25/24 2236 08/27/24 0225   WBC 22.1* 18.1*   RBC 5.39 4.58   HEMOGLOBIN 15.9 13.7   HEMATOCRIT 47.1* 40.3   MCV 87.4 88.0   MCH 29.5 29.9   MCHC 33.8 34.0   RDW 41.7 43.5   PLATELETCT 402 247   MPV 9.5 9.5     Recent Labs     08/25/24 2236 08/27/24 0225   SODIUM 139 136   POTASSIUM 3.3* 3.1*   CHLORIDE 96 102   CO2 20 19*   GLUCOSE 179* 68   BUN 11 13   CREATININE 0.68 0.52   CALCIUM 10.5 8.5     Recent Labs     08/25/24 2236 08/27/24 0225   ASTSGOT 140* 66* "   ALTSGPT 416* 217*   TBILIRUBIN 5.5* 1.9*   ALKPHOSPHAT 332* 227*   GLOBULIN 3.3 2.5           IMAGING:  [id number]   Final Result         1. No choledocholithiasis. No CBD dilatation.   2. Cholelithiasis. There is stranding surrounding the gallbladder, concerning inflammation/cholecystitis.      US-RUQ   Final Result         1.  Cholelithiasis and gallbladder sludge with gallbladder wall thickening, sonographically compatible with cholecystitis.          ASSESSMENT AND PLAN:  * Acute gallstone pancreatitis- (present on admission)  Assessment & Plan  Admit lipase >3000  8/27 MRCP with no signs of CBD dilation. GI signed off.   -Clear liquid diet    No contraindication to deep vein thrombosis (DVT) prophylaxis- (present on admission)  Assessment & Plan  VTE prophylaxis initially contraindicated secondary to elevated bleeding risk.  8/27 Lovenox initiated.                ____________________________________     MARCELINA NoPYOANNA.    DD: 8/27/2024  11:41 AM

## 2024-08-27 NOTE — CARE PLAN
The patient is Stable - Low risk of patient condition declining or worsening    Shift Goals  Clinical Goals: POC updates, MRI, antibiotics   Patient Goals: comfort, POC uodates, MRI  Family Goals: POC updates    Progress made toward(s) clinical / shift goals:  Patient and family updated about POC, order for MRI was placed. Patient is tolerating antibiotics

## 2024-08-27 NOTE — DISCHARGE PLANNING
Case Management Discharge Planning    Admission Date: 8/25/2024  GMLOS: 3.1  ALOS: 1    6-Clicks ADL Score: 24  6-Clicks Mobility Score: 24    Anticipated Discharge Dispo:      DME Needed: No    Action(s) Taken: OTHER, MSW emailed  with update at [email or web address].    Escalations Completed: None    Medically Clear: No    Barriers to Discharge: Medical clearance    Is the patient up for discharge tomorrow: No

## 2024-08-27 NOTE — PROGRESS NOTES
Assumed care at 1845.   Pt resting in bed. A&ox 4  Ambulating with SBA  NPO after MN.   Pending MRI.  Iv abx continued  Voiding adequately  Denies pain  Call light within reach. Hourly rounding in place

## 2024-08-27 NOTE — PROGRESS NOTES
Received report from previous shift RN  Assessment complete.  A&O x 4. Patient calls appropriately.  Patient ambulates with standby assist. Bed alarm off, patient called appropriately.   Patient has 0/10 pain. Patient declines interventions  Denies N&V. Tolerating on NPO diet.  + void, + flatus, LBM.yesterday 8-26  Patient denies SOB.  SCD's off, patient ambulating.  Patient sitting at edge of bed..  Review plan with of care with patient. Call light and personal belongings with in reach. Hourly rounding in place. All needs met at this time.

## 2024-08-27 NOTE — PROGRESS NOTES
4 Eyes Skin Assessment Completed by ULYSSES Weston and ULYSSES Calero.    Head WDL right eye, red   Ears WDL  Nose WDL  Mouth WDL  Neck WDL  Breast/Chest WDL  Shoulder Blades WDL  Spine WDL  (R) Arm/Elbow/Hand WDL  (L) Arm/Elbow/Hand WDL  Abdomen WDL  Groin WDL  Scrotum/Coccyx/Buttocks WDL  (R) Leg WDL  (L) Leg WDL  (R) Heel/Foot/Toe WDL  (L) Heel/Foot/Toe WDL

## 2024-08-27 NOTE — ASSESSMENT & PLAN NOTE
VTE prophylaxis initially contraindicated secondary to elevated bleeding risk.  8/27 Lovenox initiated.

## 2024-08-27 NOTE — DISCHARGE PLANNING
Case Management Discharge Planning    Admission Date: 8/25/2024  GMLOS: 3.1  ALOS: 1    6-Clicks ADL Score: 24  6-Clicks Mobility Score: 24    Anticipated Discharge Dispo:      DME Needed: No    Action(s) Taken: OTHER MSW gave Pt letter affirming her admission for the [employer].    Escalations Completed: None    Medically Clear: No

## 2024-08-27 NOTE — PROGRESS NOTES
..Gastroenterology Progress Note               Author:  Carlota Holman, EVANGELINA,  APRN Date & Time Created: 8/27/2024 7:52 AM       Patient ID:  Name:             Norma Irizarry  YOB: 1960  Age:                 63 y.o.  female  MRN:               [id number]        Medical Decision Making, by Problem:  Active Hospital Problems    Diagnosis     Acute gallstone pancreatitis [K85.10]            Presenting Chief Complaint:  Gallstone pancreatitis     History of Present Illness: 63-year-old woman with history of CVA, hypertension, hyperlipidemia presents with 4-day history of upper abdominal pain which radiates into her back.  Associated with episodes of nausea and vomiting.  Prior to this she has had recurrent episodes of right upper quadrant pain suggestive of biliary colic.  No significant alcohol use.  No pancreatic issues in the past.  Upon admission she was noted to have a lipase of greater than 3000, white count of 22,000, elevated glucose, ALT /140 with a total bilirubin of 5.5.  Ultrasound showed multiple gallstones with gallbladder wall thickening compatible with cholecystitis.  Common bile duct was measured at only 4.5 mm i.e. nondilated.     Patient denies any GI bleeding, fevers, chills.     Interval History:  8/27/2024: patient seen with  bedside. No complaints, denies nausea or vomiting. Hungry. Liver enzymes and t. Bili downtrending. MRCP negative for choledocholithiasis.     Hospital Medications:  Current Facility-Administered Medications   Medication Dose Frequency Provider Last Rate Last Admin    Respiratory Therapy Consult   Continuous RT Kin Boogie M.D.        Pharmacy Consult Request ...Pain Management Review 1 Each  1 Each PHARMACY TO DOSE Kin Boogie M.D.        ondansetron (Zofran) syringe/vial injection 4 mg  4 mg Q4HRS PRN Kin Boogie M.D.        ondansetron (Zofran ODT) dispertab 4 mg  4 mg Q4HRS PRN Kin Boogie M.D.         docusate sodium (Colace) capsule 100 mg  100 mg BID Kin Boogie M.D.   100 mg at 08/26/24 0616    senna-docusate (Pericolace Or Senokot S) 8.6-50 MG per tablet 1 Tablet  1 Tablet Nightly Kin Boogie M.D.        senna-docusate (Pericolace Or Senokot S) 8.6-50 MG per tablet 1 Tablet  1 Tablet Q24HRS PRN Kin Boogie M.D.        polyethylene glycol/lytes (Miralax) Packet 1 Packet  1 Packet BID Kin Boogie M.D.   1 Packet at 08/26/24 0617    magnesium hydroxide (Milk Of Magnesia) suspension 30 mL  30 mL DAILY AT 1800 Kin Boogie M.D.   30 mL at 08/26/24 0142    bisacodyl (Dulcolax) suppository 10 mg  10 mg Q24HRS PRNAA Boogie M.D.        sodium phosphate enema 1 Each  1 Each Once PRN Kin Boogie M.D.        LR infusion   Continuous Kin Boogie M.D. 125 mL/hr at 08/26/24 1317 New Bag at 08/26/24 1317    acetaminophen (Tylenol) tablet 1,000 mg  1,000 mg Q6HRS Kin Boogie M.D.   1,000 mg at 08/27/24 0151    Followed by    [START ON 8/31/2024] acetaminophen (Tylenol) tablet 1,000 mg  1,000 mg Q6HRS PRANA Boogie M.D.        celecoxib (CeleBREX) capsule 200 mg  200 mg BID Kin Boogie M.D.   200 mg at 08/26/24 1628    Followed by    [START ON 8/31/2024] celecoxib (CeleBREX) capsule 200 mg  200 mg BID PRN Kin Boogie M.D.        oxyCODONE immediate-release (Roxicodone) tablet 5 mg  5 mg Q3HRS PRANA Boogie M.D.        Or    oxyCODONE immediate release (Roxicodone) tablet 10 mg  10 mg Q3HRS PRN Kin Boogie M.D.        Or    HYDROmorphone (Dilaudid) injection 0.5 mg  0.5 mg Q3HRS PRN Kin Boogie M.D.        piperacillin-tazobactam (Zosyn) 4.5 g in  mL IVPB  4.5 g Q8HRS Kin Boogie M.D. 25 mL/hr at 08/27/24 0458 4.5 g at 08/27/24 0458    famotidine (Pepcid) tablet 20 mg  20 mg BID Kin Boogie M.D.   20 mg at 08/26/24 1625    Or    famotidine (Pepcid) injection 20 mg  20 mg BID Kin Boogie M.D.   20 mg at 08/27/24 0600    atorvastatin (Lipitor) tablet 10 mg  10 mg  "Nightly Kin Boogie M.D.   10 mg at 08/26/24 2051   Last reviewed on 8/26/2024  7:55 AM by Jo Smyth       Review of Systems:  Review of Systems   Constitutional:  Negative for chills, fever and malaise/fatigue.   HENT:  Negative for hearing loss.    Eyes:  Negative for blurred vision.   Respiratory:  Negative for cough and shortness of breath.    Cardiovascular:  Negative for chest pain and leg swelling.   Gastrointestinal:  Negative for abdominal pain, blood in stool, constipation, diarrhea, heartburn, melena, nausea and vomiting.   Genitourinary:  Negative for dysuria.   Musculoskeletal:  Negative for back pain.   Skin:  Negative for rash.   Neurological:  Negative for dizziness and weakness.   Psychiatric/Behavioral:  Negative for depression.    All other systems reviewed and are negative.        Vital signs:  Weight/BMI: Body mass index is 23.89 kg/m².  /67   Pulse 81   Temp 36.7 °C (98.1 °F) (Temporal)   Resp 16   Ht 1.62 m (5' 3.78\")   Wt 62.7 kg (138 lb 3.7 oz)   SpO2 94%   Vitals:    08/26/24 1634 08/26/24 1645 08/26/24 2115 08/27/24 0433   BP:  (!) 150/91 134/77 114/67   Pulse:  69 76 81   Resp:  16 16 16   Temp:  36.9 °C (98.4 °F) 36.8 °C (98.2 °F) 36.7 °C (98.1 °F)   TempSrc:  Temporal Temporal Temporal   SpO2:  94% 96% 94%   Weight: 62.7 kg (138 lb 3.7 oz)      Height:         Oxygen Therapy:  Pulse Oximetry: 94 %, O2 (LPM): 0, O2 Delivery Device: None - Room Air    Intake/Output Summary (Last 24 hours) at 8/27/2024 0752  Last data filed at 8/27/2024 0400  Gross per 24 hour   Intake 1600 ml   Output --   Net 1600 ml         Physical Exam:  Physical Exam  Vitals and nursing note reviewed.   Constitutional:       General: She is not in acute distress.     Appearance: Normal appearance.   HENT:      Head: Normocephalic and atraumatic.      Right Ear: External ear normal.      Left Ear: External ear normal.      Nose: Nose normal.      Mouth/Throat:      Mouth: Mucous membranes are " moist.      Pharynx: Oropharynx is clear.   Eyes:      General: No scleral icterus.  Cardiovascular:      Rate and Rhythm: Normal rate and regular rhythm.      Pulses: Normal pulses.      Heart sounds: Normal heart sounds.   Pulmonary:      Effort: Pulmonary effort is normal. No respiratory distress.      Breath sounds: Normal breath sounds.   Abdominal:      General: Abdomen is flat. Bowel sounds are normal. There is no distension.      Palpations: Abdomen is soft.      Tenderness: There is no abdominal tenderness.   Musculoskeletal:         General: Normal range of motion.      Cervical back: Normal range of motion.   Skin:     General: Skin is warm and dry.      Capillary Refill: Capillary refill takes less than 2 seconds.   Neurological:      Mental Status: She is alert and oriented to person, place, and time.   Psychiatric:         Mood and Affect: Mood normal.         Behavior: Behavior normal.             Labs:  Recent Labs     08/25/24 2236 08/27/24 0225   SODIUM 139 136   POTASSIUM 3.3* 3.1*   CHLORIDE 96 102   CO2 20 19*   BUN 11 13   CREATININE 0.68 0.52   MAGNESIUM 2.0  --    PHOSPHORUS 1.8*  --    CALCIUM 10.5 8.5     Recent Labs     08/25/24 2236 08/27/24 0225   ALTSGPT 416* 217*   ASTSGOT 140* 66*   ALKPHOSPHAT 332* 227*   TBILIRUBIN 5.5* 1.9*   LIPASE >3000*  --    GLUCOSE 179* 68     Recent Labs     08/25/24 2236 08/27/24 0225   WBC 22.1* 18.1*   NEUTSPOLYS 81.10* 85.50*   LYMPHOCYTES 13.00* 6.50*   MONOCYTES 5.30 6.70   EOSINOPHILS 0.00 0.60   BASOPHILS 0.20 0.30   ASTSGOT 140* 66*   ALTSGPT 416* 217*   ALKPHOSPHAT 332* 227*   TBILIRUBIN 5.5* 1.9*     Recent Labs     08/25/24 2236 08/27/24 0225   RBC 5.39 4.58   HEMOGLOBIN 15.9 13.7   HEMATOCRIT 47.1* 40.3   PLATELETCT 402 247     Recent Results (from the past 24 hour(s))   POCT glucose device results    Collection Time: 08/26/24 12:43 PM   Result Value Ref Range    POC Glucose, Blood 94 65 - 99 mg/dL   POCT glucose device results     Collection Time: 08/26/24  1:33 PM   Result Value Ref Range    POC Glucose, Blood 80 65 - 99 mg/dL   POCT glucose device results    Collection Time: 08/26/24  5:42 PM   Result Value Ref Range    POC Glucose, Blood 88 65 - 99 mg/dL   CBC with Differential: Tomorrow AM    Collection Time: 08/27/24  2:25 AM   Result Value Ref Range    WBC 18.1 (H) 4.8 - 10.8 K/uL    RBC 4.58 4.20 - 5.40 M/uL    Hemoglobin 13.7 12.0 - 16.0 g/dL    Hematocrit 40.3 37.0 - 47.0 %    MCV 88.0 81.4 - 97.8 fL    MCH 29.9 27.0 - 33.0 pg    MCHC 34.0 32.2 - 35.5 g/dL    RDW 43.5 35.9 - 50.0 fL    Platelet Count 247 164 - 446 K/uL    MPV 9.5 9.0 - 12.9 fL    Neutrophils-Polys 85.50 (H) 44.00 - 72.00 %    Lymphocytes 6.50 (L) 22.00 - 41.00 %    Monocytes 6.70 0.00 - 13.40 %    Eosinophils 0.60 0.00 - 6.90 %    Basophils 0.30 0.00 - 1.80 %    Immature Granulocytes 0.40 0.00 - 0.90 %    Nucleated RBC 0.00 0.00 - 0.20 /100 WBC    Neutrophils (Absolute) 15.45 (H) 1.82 - 7.42 K/uL    Lymphs (Absolute) 1.17 1.00 - 4.80 K/uL    Monos (Absolute) 1.22 (H) 0.00 - 0.85 K/uL    Eos (Absolute) 0.11 0.00 - 0.51 K/uL    Baso (Absolute) 0.05 0.00 - 0.12 K/uL    Immature Granulocytes (abs) 0.08 0.00 - 0.11 K/uL    NRBC (Absolute) 0.00 K/uL   Comp Metabolic Panel (CMP): Tomorrow AM    Collection Time: 08/27/24  2:25 AM   Result Value Ref Range    Sodium 136 135 - 145 mmol/L    Potassium 3.1 (L) 3.6 - 5.5 mmol/L    Chloride 102 96 - 112 mmol/L    Co2 19 (L) 20 - 33 mmol/L    Anion Gap 15.0 7.0 - 16.0    Glucose 68 65 - 99 mg/dL    Bun 13 8 - 22 mg/dL    Creatinine 0.52 0.50 - 1.40 mg/dL    Calcium 8.5 8.5 - 10.5 mg/dL    Correct Calcium 8.8 8.5 - 10.5 mg/dL    AST(SGOT) 66 (H) 12 - 45 U/L    ALT(SGPT) 217 (H) 2 - 50 U/L    Alkaline Phosphatase 227 (H) 30 - 99 U/L    Total Bilirubin 1.9 (H) 0.1 - 1.5 mg/dL    Albumin 3.6 3.2 - 4.9 g/dL    Total Protein 6.1 6.0 - 8.2 g/dL    Globulin 2.5 1.9 - 3.5 g/dL    A-G Ratio 1.4 g/dL   ESTIMATED GFR    Collection Time:  08/27/24  2:25 AM   Result Value Ref Range    GFR (CKD-EPI) 104 >60 mL/min/1.73 m 2       Radiology Review:  US-RUQ   Final Result         1.  Cholelithiasis and gallbladder sludge with gallbladder wall thickening, sonographically compatible with cholecystitis.      [id number]    (Results Pending)         MDM (Data Review):   -Records reviewed and summarized in current documentation  -I personally reviewed and interpreted the laboratory results  -I personally reviewed the radiology images    Assessment/Recommendations:  Impressions:  1.  Gallstone pancreatitis.  2.  Acute cholecystitis.  3.  Elevated liver enzymes    Recommendations:  MRCP negative for choledocholithiasis   Liver enzymes and T. Bili trending down  No indication for ERCP.    GI team signing off. Please don't hesitate to contact us with any questions or concerns.    Discussed with patient and her , Dr. Calix, Voalte sent to [facility] APRN    ...Carlota Holman, EVANGELINA,  APRN    Core Quality Measures   Reviewed items::  Labs, Medications and Radiology reports reviewed

## 2024-08-28 VITALS
WEIGHT: 138.23 LBS | OXYGEN SATURATION: 98 % | TEMPERATURE: 98.6 F | HEART RATE: 93 BPM | HEIGHT: 64 IN | BODY MASS INDEX: 23.6 KG/M2 | RESPIRATION RATE: 18 BRPM | DIASTOLIC BLOOD PRESSURE: 84 MMHG | SYSTOLIC BLOOD PRESSURE: 124 MMHG

## 2024-08-28 LAB
ALBUMIN SERPL BCP-MCNC: 4 G/DL (ref 3.2–4.9)
ALBUMIN/GLOB SERPL: 1.2 G/DL
ALP SERPL-CCNC: 237 U/L (ref 30–99)
ALT SERPL-CCNC: 177 U/L (ref 2–50)
ANION GAP SERPL CALC-SCNC: 17 MMOL/L (ref 7–16)
AST SERPL-CCNC: 47 U/L (ref 12–45)
BASOPHILS # BLD AUTO: 0.2 % (ref 0–1.8)
BASOPHILS # BLD: 0.03 K/UL (ref 0–0.12)
BILIRUB SERPL-MCNC: 1.4 MG/DL (ref 0.1–1.5)
BUN SERPL-MCNC: 7 MG/DL (ref 8–22)
CALCIUM ALBUM COR SERPL-MCNC: 9.4 MG/DL (ref 8.5–10.5)
CALCIUM SERPL-MCNC: 9.4 MG/DL (ref 8.5–10.5)
CHLORIDE SERPL-SCNC: 98 MMOL/L (ref 96–112)
CO2 SERPL-SCNC: 21 MMOL/L (ref 20–33)
CREAT SERPL-MCNC: 0.53 MG/DL (ref 0.5–1.4)
EOSINOPHIL # BLD AUTO: 0.07 K/UL (ref 0–0.51)
EOSINOPHIL NFR BLD: 0.4 % (ref 0–6.9)
ERYTHROCYTE [DISTWIDTH] IN BLOOD BY AUTOMATED COUNT: 42.6 FL (ref 35.9–50)
GFR SERPLBLD CREATININE-BSD FMLA CKD-EPI: 104 ML/MIN/1.73 M 2
GLOBULIN SER CALC-MCNC: 3.3 G/DL (ref 1.9–3.5)
GLUCOSE SERPL-MCNC: 141 MG/DL (ref 65–99)
HCT VFR BLD AUTO: 42.4 % (ref 37–47)
HGB BLD-MCNC: 14.6 G/DL (ref 12–16)
IMM GRANULOCYTES # BLD AUTO: 0.07 K/UL (ref 0–0.11)
IMM GRANULOCYTES NFR BLD AUTO: 0.4 % (ref 0–0.9)
LIPASE SERPL-CCNC: 1497 U/L (ref 11–82)
LYMPHOCYTES # BLD AUTO: 1.38 K/UL (ref 1–4.8)
LYMPHOCYTES NFR BLD: 7.4 % (ref 22–41)
MCH RBC QN AUTO: 30.2 PG (ref 27–33)
MCHC RBC AUTO-ENTMCNC: 34.4 G/DL (ref 32.2–35.5)
MCV RBC AUTO: 87.8 FL (ref 81.4–97.8)
MONOCYTES # BLD AUTO: 1.31 K/UL (ref 0–0.85)
MONOCYTES NFR BLD AUTO: 7.1 % (ref 0–13.4)
NEUTROPHILS # BLD AUTO: 15.67 K/UL (ref 1.82–7.42)
NEUTROPHILS NFR BLD: 84.5 % (ref 44–72)
NRBC # BLD AUTO: 0 K/UL
NRBC BLD-RTO: 0 /100 WBC (ref 0–0.2)
PLATELET # BLD AUTO: 273 K/UL (ref 164–446)
PMV BLD AUTO: 9.7 FL (ref 9–12.9)
POTASSIUM SERPL-SCNC: 3.7 MMOL/L (ref 3.6–5.5)
PROT SERPL-MCNC: 7.3 G/DL (ref 6–8.2)
RBC # BLD AUTO: 4.83 M/UL (ref 4.2–5.4)
SODIUM SERPL-SCNC: 136 MMOL/L (ref 135–145)
WBC # BLD AUTO: 18.5 K/UL (ref 4.8–10.8)

## 2024-08-28 PROCEDURE — A9270 NON-COVERED ITEM OR SERVICE: HCPCS | Performed by: SURGERY

## 2024-08-28 PROCEDURE — 85025 COMPLETE CBC W/AUTO DIFF WBC: CPT

## 2024-08-28 PROCEDURE — 700102 HCHG RX REV CODE 250 W/ 637 OVERRIDE(OP): Performed by: SURGERY

## 2024-08-28 PROCEDURE — 99239 HOSP IP/OBS DSCHRG MGMT >30: CPT

## 2024-08-28 PROCEDURE — 80053 COMPREHEN METABOLIC PANEL: CPT

## 2024-08-28 PROCEDURE — 83690 ASSAY OF LIPASE: CPT

## 2024-08-28 RX ADMIN — CELECOXIB 200 MG: 200 CAPSULE ORAL at 04:15

## 2024-08-28 RX ADMIN — FAMOTIDINE 20 MG: 20 TABLET, FILM COATED ORAL at 04:15

## 2024-08-28 NOTE — DISCHARGE SUMMARY
DISCHARGE  SUMMARY    DATE OF ADMISSION: 8/25/2024    DATE OF DISCHARGE: 8/28/2024    DISCHARGE DIAGNOSIS:  Principal Problem:    Acute gallstone pancreatitis  Active Problems:    No contraindication to deep vein thrombosis (DVT) prophylaxis  Resolved Problems:    * No resolved hospital problems. *    CONSULTATIONS:  Dr. Audie Calix MD, Gastroenterology    PROCEDURES:  None    BRIEF HPI and HOSPITAL COURSE:  The patient is a 63 year-old [place] woman who presented to the Emergency Department a 4-day history of nausea, vomiting, progressive epigastric pain, and right subcostal abdominal pain. A right upper quadrant ultrasound was obtained and showed cholelithiasis and possible cholecystitis. Labs in the emergency department were notable for an elevated total bilirubin and lipase. General surgery was consulted and the patient was admitted to the blunt on gastrointestinal rest and pain control measures for gallstone pancreatitis. An MRCP was obtained and showed no choledocholithiasis. The MRCP was reviewed gastroenterology who recommended no further interventions needed. The MRCP was notable for cholelithiasis and possible concerns for cholecystitis. The patient declined to undergo cholecystectomy during this hospital admission and wishes to return locally to [place] for this operation. On day of discharge, the patient's lipase and elevate liver function tests were trending down. She had resolved abdominal pain, nausea and vomiting. She was tolerating a regular diet. She was discharged home with outpatient follow up as discussed below.    DISPOSITION:   Discharged home on 8/28/24. The patient was counseled and questions were answered. Specifically, signs and symptoms of infection, respiratory decompensation, and persistent or worsening pain were discussed and the patient agrees to seek medical attention if any of these develop.    DISCHARGE MEDICATIONS:  The patients controlled substance history was  reviewed and a controlled substance use informed consent (if applicable) was provided by [facility] and the patient has been prescribed.     Medication List        CONTINUE taking these medications        Instructions   aspirin 81 MG EC tablet   Take 81 mg by mouth at bedtime.  Dose: 81 mg     atorvastatin 10 MG Tabs  Commonly known as: Lipitor   Take 10 mg by mouth every evening.  Dose: 10 mg            ACTIVITY:  as tolerated    DIET: Low fat diet    FOLLOW UP:  Local General Surgeon    Follow up  Follow up with a local general surgeon in [facility] to discuss a future cholecystectomy.    Local Emergency Department    Follow up  Go to the nearest emergency department if your symptoms return such as abdominal pain, nausea, vomiting, or jaundice.        TIME SPENT ON DISCHARGE: 33 minutes        ____________________________________________  ARABELLA Arroyo

## 2024-08-28 NOTE — CARE PLAN
Problem: Pain - Standard  Goal: Alleviation of pain or a reduction in pain to the patient’s comfort goal  Outcome: Progressing     Problem: Knowledge Deficit - Standard  Goal: Patient and family/care givers will demonstrate understanding of plan of care, disease process/condition, diagnostic tests and medications  Outcome: Progressing     Problem: Infection - Standard  Goal: Patient will remain free from infection  Outcome: Progressing   The patient is Stable - Low risk of patient condition declining or worsening    Shift Goals  Clinical Goals: monitor labs, plan of care  Patient Goals: rest, go home  Family Goals: monika    Progress made toward(s) clinical / shift goals:  labs recorded. Plan of care updated.    Patient is not progressing towards the following goals:

## 2024-08-28 NOTE — PROGRESS NOTES
Discharge Lounge order placed and patient educated. Care plan and patient education completed. All belongings returned to patient. Patient transported via wheelchair to discharge lounge. Patient will arrange for Uber ride home.

## 2024-08-28 NOTE — PROGRESS NOTES
Patient to be discharged today - patient aware and agreeable to plan. D/c instructions reviewed with patient - ?'s/concerns answered. No piv present and no meds to beds.

## 2024-08-28 NOTE — PROGRESS NOTES
Bedside report received.  Assessment complete.  A&O x 4. Patient calls appropriately.  Patient ambulates with no assist.  Patient has 0/10 pain. Patient declines intervention at this time  Denies N&V. Tolerating clear liquid diet.  + void, + flatus, + BM.  Patient denies SOB and chest pain.  SCD's off, patient ambulating room independently.  Review plan with of care with patient. Call light and personal belongings within reach. Hourly rounding in place. All needs met at this time.

## 2024-08-28 NOTE — CARE PLAN
Problem: Pain - Standard  Goal: Alleviation of pain or a reduction in pain to the patient’s comfort goal  Outcome: Progressing     Problem: Knowledge Deficit - Standard  Goal: Patient and family/care givers will demonstrate understanding of plan of care, disease process/condition, diagnostic tests and medications  Outcome: Progressing     Problem: Psychosocial  Goal: Patient's level of anxiety will decrease  Outcome: Progressing  Goal: Patient's ability to verbalize feelings about condition will improve  Outcome: Progressing  Goal: Patient's ability to re-evaluate and adapt role responsibilities will improve  Outcome: Progressing  Goal: Patient and family will demonstrate ability to cope with life altering diagnosis and/or procedure  Outcome: Progressing  Goal: Spiritual and cultural needs incorporated into hospitalization  Outcome: Progressing     Problem: Communication  Goal: The ability to communicate needs accurately and effectively will improve  Outcome: Progressing     Problem: Discharge Barriers/Planning  Goal: Patient's continuum of care needs are met  Outcome: Progressing     Problem: Hemodynamics  Goal: Patient's hemodynamics, fluid balance and neurologic status will be stable or improve  Outcome: Progressing     Problem: Respiratory  Goal: Patient will achieve/maintain optimum respiratory ventilation and gas exchange  Outcome: Progressing     Problem: Chest Tube Management  Goal: Complications related to chest tube will be avoided or minimized  Outcome: Progressing     Problem: Fluid Volume  Goal: Fluid volume balance will be maintained  Outcome: Progressing     Problem: Mechanical Ventilation  Goal: Safe management of artificial airway and ventilation  Outcome: Progressing  Goal: Successful weaning off mechanical ventilator, spontaneously maintains adequate gas exchange  Outcome: Progressing  Goal: Patient will be able to express needs and understand communication  Outcome: Progressing     Problem:  Dysphagia  Goal: Dysphagia will improve  Outcome: Progressing     Problem: Risk for Aspiration  Goal: Patient's risk for aspiration will be absent or decrease  Outcome: Progressing     Problem: Nutrition  Goal: Patient's nutritional and fluid intake will be adequate or improve  Outcome: Progressing  Goal: Enteral nutrition will be maintained or improve  Outcome: Progressing  Goal: Enteral nutrition will be maintained or improve  Outcome: Progressing     Problem: Urinary Elimination  Goal: Establish and maintain regular urinary output  Outcome: Progressing     Problem: Bowel Elimination  Goal: Establish and maintain regular bowel function  Outcome: Progressing     Problem: Gastrointestinal Irritability  Goal: Nausea and vomiting will be absent or improve  Outcome: Progressing  Goal: Diarrhea will be absent or improved  Outcome: Progressing     Problem: Rectal Tube  Goal: Fecal output will be contained and skin will remain free from irritation  Outcome: Progressing     Problem: Mobility  Goal: Patient's capacity to carry out activities will improve  Outcome: Progressing     Problem: Self Care  Goal: Patient will have the ability to perform ADLs independently or with assistance (bathe, groom, dress, toilet and feed)  Outcome: Progressing     Problem: Infection - Standard  Goal: Patient will remain free from infection  Outcome: Progressing     Problem: Wound/ / Incision Healing  Goal: Patient's wound/surgical incision will decrease in size and heals properly  Outcome: Progressing   The patient is Stable - Low risk of patient condition declining or worsening    Shift Goals  Clinical Goals: pain/nausea management  Patient Goals: discharge planning  Family Goals: monika    Progress made toward(s) clinical / shift goals:  Patient with minimal pain and no nausea or vomiting overnight. Patient is resting in bed with all needs met at this time. Medicated per MAR.    Patient is not progressing towards the following goals:

## 2024-08-28 NOTE — PROGRESS NOTES
"Bedside report received.  Assessment complete.  A&O x 4. Patient calls appropriately.  Patient ambulates with no assist. Bed alarm off.   Patient has 0/10 pain. Patient medicated per MAR.  Denies N&V. Tolerating clear liquid diet.  + void, + flatus, + BM.  Patient denies SOB.  SCD's off, patient ambulating ad clark.  Patient is pleasant and cooperative with the care plan.  Review plan with of care with patient. Call light and personal belongings within reach. Hourly rounding in place. All needs met at this time.  BP (!) 148/85   Pulse 82   Temp 36.8 °C (98.2 °F) (Temporal)   Resp 16   Ht 1.62 m (5' 3.78\")   Wt 62.7 kg (138 lb 3.7 oz)   SpO2 95%   BMI 23.89 kg/m²     "